# Patient Record
Sex: FEMALE | Race: BLACK OR AFRICAN AMERICAN | NOT HISPANIC OR LATINO | Employment: UNEMPLOYED | URBAN - METROPOLITAN AREA
[De-identification: names, ages, dates, MRNs, and addresses within clinical notes are randomized per-mention and may not be internally consistent; named-entity substitution may affect disease eponyms.]

---

## 2017-06-15 ENCOUNTER — HOSPITAL ENCOUNTER (EMERGENCY)
Facility: HOSPITAL | Age: 37
Discharge: HOME | End: 2017-06-15
Attending: EMERGENCY MEDICINE
Payer: MEDICAID

## 2017-06-15 VITALS
OXYGEN SATURATION: 97 % | DIASTOLIC BLOOD PRESSURE: 64 MMHG | BODY MASS INDEX: 46.1 KG/M2 | HEIGHT: 64 IN | RESPIRATION RATE: 18 BRPM | WEIGHT: 270 LBS | HEART RATE: 88 BPM | TEMPERATURE: 99 F | SYSTOLIC BLOOD PRESSURE: 130 MMHG

## 2017-06-15 DIAGNOSIS — G40.909 SEIZURE DISORDER: Primary | ICD-10-CM

## 2017-06-15 LAB — CARBAMAZEPINE SERPL-MCNC: 2.1 UG/ML

## 2017-06-15 RX ORDER — TOPIRAMATE 100 MG/1
100 TABLET, FILM COATED ORAL 2 TIMES DAILY
COMMUNITY

## 2017-06-15 RX ORDER — VENLAFAXINE HYDROCHLORIDE 150 MG/1
75 CAPSULE, EXTENDED RELEASE ORAL DAILY
COMMUNITY

## 2017-06-15 RX ORDER — CARBAMAZEPINE 200 MG/1
200 TABLET ORAL 2 TIMES DAILY
COMMUNITY

## 2017-06-15 RX ORDER — TRAZODONE HYDROCHLORIDE 50 MG/1
50 TABLET ORAL NIGHTLY
COMMUNITY

## 2020-09-26 NOTE — ED PROVIDER NOTES
SCRIBE #1 NOTE: I, Jewell Reilly, am scribing for, and in the presence of, Kayode Werner Jr., MD. I have scribed the entire note.      History      Chief Complaint   Patient presents with    Seizures     began yesterday, poor appetitie, with headaches       Review of patient's allergies indicates:  No Known Allergies     HPI   HPI    6/15/2017, 7:02 PM   History obtained from the girlfriend and patient      History of Present Illness: Charito Patel is a 36 y.o. female patient, with a PMHx of a seizure disorder, who presents to the Emergency Department for seizures which onset suddenly 2 days ago. Girlfriend states that pt has been off of her Tegretol for a few days and had multiple seizures yesterday. Sx have been episodic and moderate in severity. No modifying factors noted. No associated sx included. Pt denies any fever, chills, CP, SOB, n/v/d, HA, dizziness, speech difficulty, visual disturbance, or focal weakness/numbness. Girlfriend and pt are requesting that a tegretol level be drawn. No further complaints at this time.       Arrival mode: Personal vehicle     PCP: No primary care provider on file.       Past Medical History:  Past medical history reviewed not relevant      Past Surgical History:  Past surgical history reviewed not relevant      Family History:  Family history reviewed not relevant      Social History:  Social History Main Topics    Smoking status: Unknown if ever smoked    Smokeless tobacco: Unknown if ever used    Alcohol Use: Unknown drinking history    Drug Use: Unknown if ever used    Sexual Activity: Unknown       ROS   Review of Systems   Constitutional: Negative for chills, diaphoresis and fever.   HENT: Negative for sore throat.    Eyes: Negative for visual disturbance.   Respiratory: Negative for shortness of breath.    Cardiovascular: Negative for chest pain.   Gastrointestinal: Negative for abdominal pain, blood in stool, constipation, diarrhea, nausea and  "vomiting.   Genitourinary: Negative for dysuria.   Musculoskeletal: Negative for back pain, neck pain and neck stiffness.   Skin: Negative for rash.   Neurological: Positive for seizures. Negative for dizziness, speech difficulty, weakness, light-headedness, numbness and headaches.   Hematological: Does not bruise/bleed easily.   Psychiatric/Behavioral: Negative for suicidal ideas.        (-) HI     Physical Exam      Initial Vitals [06/15/17 1852]   BP Pulse Resp Temp SpO2   (!) 181/100 (!) 117 18 98.6 °F (37 °C) 96 %      Physical Exam  Nursing Notes and Vital Signs Reviewed.  Constitutional: Patient is in no acute distress. Well-developed and well-nourished.  Head: Atraumatic. Normocephalic.  Eyes: PERRL. EOM intact. Conjunctivae are not pale. No scleral icterus.  ENT: Mucous membranes are moist. Oropharynx is clear and symmetric.    Neck: Supple. Full ROM. No lymphadenopathy.  Cardiovascular: Regular rate. Regular rhythm. No murmurs, rubs, or gallops. Distal pulses are 2+ and symmetric.  Pulmonary/Chest: No respiratory distress. Clear to auscultation bilaterally. No wheezing, rales, or rhonchi.  Abdominal: Soft and non-distended.  There is no tenderness.  No rebound, guarding, or rigidity.  Musculoskeletal: Moves all extremities. No obvious deformities.   Skin: Warm and dry.  Neurological:  Alert, awake, and oriented x3.  Normal speech.  No acute focal neurological deficits are appreciated.  Psychiatric: Normal affect. Good eye contact. Appropriate in content.    ED Course    Procedures  ED Vital Signs:  Vitals:    06/15/17 1852 06/15/17 1939   BP: (!) 181/100 130/64   Pulse: (!) 117 88   Resp: 18 18   Temp: 98.6 °F (37 °C) 98.5 °F (36.9 °C)   TempSrc: Oral    SpO2: 96% 97%   Weight: 122.5 kg (270 lb)    Height: 5' 4" (1.626 m)        Abnormal Lab Results:  Labs Reviewed   CARBAMAZEPINE LEVEL, TOTAL             The Emergency Provider reviewed the vital signs and test results, which are outlined above.    ED " Discussion     7:32 PM: Discussed with pt all pertinent ED information and plan of tx. Informed pt that tegretol level will not result today. Instructed pt to f/u with Dr. Cervantes (Neurology) in a few days. Gave pt all f/u and return to the ED instructions. All questions and concerns were addressed at this time. Pt expresses understanding of information and instructions, and is comfortable with plan to discharge. Pt is stable for discharge.      Follow-up Information     Silvestre Cervantes MD. Call in 2 days.    Specialty:  Neurology  Contact information:  [address]  [place] [address] [phone]                     Medical Decision Making    Medical Decision Making:   Clinical Tests:   Lab Tests: Ordered           Scribe Attestation:   Scribe #1: I performed the above scribed service and the documentation accurately describes the services I performed. I attest to the accuracy of the note.    Attending:   Physician Attestation Statement for Scribe #1: I, Kayode Werner Jr., MD, personally performed the services described in this documentation, as scribed by Jewell Reilly, in my presence, and it is both accurate and complete.          Clinical Impression       ICD-10-CM ICD-9-CM   1. Seizure disorder G40.909 345.90       Disposition:   Disposition: Discharged  Condition: Stable         Kayode Werner Jr., MD  06/15/17 1958     intact